# Patient Record
Sex: MALE | Race: OTHER | HISPANIC OR LATINO | Employment: FULL TIME | URBAN - METROPOLITAN AREA
[De-identification: names, ages, dates, MRNs, and addresses within clinical notes are randomized per-mention and may not be internally consistent; named-entity substitution may affect disease eponyms.]

---
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
## 2023-06-13 ENCOUNTER — HOSPITAL ENCOUNTER (EMERGENCY)
Facility: HOSPITAL | Age: 18
Discharge: HOME | End: 2023-06-13
Attending: EMERGENCY MEDICINE | Admitting: EMERGENCY MEDICINE

## 2023-06-13 VITALS
TEMPERATURE: 98.1 F | RESPIRATION RATE: 20 BRPM | WEIGHT: 168.65 LBS | DIASTOLIC BLOOD PRESSURE: 69 MMHG | SYSTOLIC BLOOD PRESSURE: 122 MMHG | HEART RATE: 75 BPM | OXYGEN SATURATION: 98 %

## 2023-06-13 DIAGNOSIS — S40.021A HEMATOMA OF ARM, RIGHT, INITIAL ENCOUNTER: Primary | ICD-10-CM

## 2023-06-13 PROCEDURE — 99283 EMERGENCY DEPT VISIT LOW MDM: CPT

## 2023-06-13 PROCEDURE — 99283 EMERGENCY DEPT VISIT LOW MDM: CPT | Performed by: EMERGENCY MEDICINE

## 2023-06-13 NOTE — Clinical Note
Dorian Marmolejo was seen and treated in our emergency department on 6/13/2023. No restrictions            Diagnosis:     Nj Alcala  may return to work on return date. He may return on this date: 06/15/2023    Nj Alcala can return to work without any restrictions on the date provided. If you have any questions or concerns, please don't hesitate to call.       Delonte Hunter, DO    ______________________________           _______________          _______________  Hospital Representative                              Date                                Time

## 2023-06-14 NOTE — DISCHARGE INSTRUCTIONS
Should you notice that you have any more bruising in different areas of your body, any episodes of significant bleeding from seemingly minor cuts, I would consider calling and scheduling a follow-up appointment with your PCP and/or coming immediately to the emergency department for evaluation.

## 2023-06-14 NOTE — ED PROVIDER NOTES
History  Chief Complaint   Patient presents with   • Medical Problem     Gave plasma on Friday, and has a bruise that "work wants me to be cleared for". No sob, chest pain, n/v/d. Carmen Castro is a 21year old male presenting for evaluation of bruising at the site on his arm that he used to donate plasma 5 days ago. Patient states that they used his right antecubital area for the site of his plasma donation. Initially after he donated plasma, he noted a small amount of bruising on the anterior bicep just proximal to the elbow. Over the subsequent days, the bruising has grown in size, he notes minor tenderness to the region. Patient went to work on Monday, he works for [employer], one of his coworkers noticed the bruising on his arm and reported it to his boss, his boss requested that he be evaluated prior to returning to work. He denies any history of easy bruising or bleeding. Denies any family history of bleeding disorders. Patient is requesting a work note to return to work. History provided by:  Patient   used: No        Prior to Admission Medications   Prescriptions Last Dose Informant Patient Reported? Taking?   ondansetron (ZOFRAN) 4 mg tablet   No No   Sig: Take 1 tablet (4 mg total) by mouth every 6 (six) hours      Facility-Administered Medications: None       Past Medical History:   Diagnosis Date   • Asthma        History reviewed. No pertinent surgical history. History reviewed. No pertinent family history. I have reviewed and agree with the history as documented. E-Cigarette/Vaping     E-Cigarette/Vaping Substances     Social History     Tobacco Use   • Smoking status: Former   • Smokeless tobacco: Never   Substance Use Topics   • Alcohol use: Yes     Comment: occa. • Drug use: Yes     Types: Marijuana     Comment: daily        Review of Systems   Constitutional: Negative for chills and fever. HENT: Negative for ear pain and sore throat.     Eyes: Negative for pain and visual disturbance. Respiratory: Negative for cough and shortness of breath. Cardiovascular: Negative for chest pain and palpitations. Gastrointestinal: Negative for abdominal pain and vomiting. Genitourinary: Negative for dysuria and hematuria. Musculoskeletal: Negative for arthralgias and back pain. Skin: Negative for color change and rash. Neurological: Negative for seizures and syncope. Hematological:        Bruising after donating plasma 5 days ago   All other systems reviewed and are negative. Physical Exam  ED Triage Vitals [06/13/23 2204]   Temperature Pulse Respirations Blood Pressure SpO2   98.1 °F (36.7 °C) 75 20 122/69 98 %      Temp Source Heart Rate Source Patient Position - Orthostatic VS BP Location FiO2 (%)   Tympanic Monitor Sitting Left arm --      Pain Score       --             Orthostatic Vital Signs  Vitals:    06/13/23 2204   BP: 122/69   Pulse: 75   Patient Position - Orthostatic VS: Sitting       Physical Exam  Vitals and nursing note reviewed. Constitutional:       Appearance: Normal appearance. HENT:      Head: Normocephalic and atraumatic. Right Ear: External ear normal.      Left Ear: External ear normal.      Mouth/Throat:      Mouth: Mucous membranes are moist.      Pharynx: Oropharynx is clear. Eyes:      General: No scleral icterus. Conjunctiva/sclera: Conjunctivae normal.   Cardiovascular:      Rate and Rhythm: Normal rate and regular rhythm. Pulses: Normal pulses. Heart sounds: Normal heart sounds. Pulmonary:      Effort: Pulmonary effort is normal. No respiratory distress. Breath sounds: Normal breath sounds. Abdominal:      General: Abdomen is flat. There is no distension. Tenderness: There is no abdominal tenderness. Musculoskeletal:         General: No tenderness or signs of injury. Cervical back: Neck supple. No rigidity. Skin:     General: Skin is warm.       Coloration: Skin is not jaundiced. Findings: Bruising present. No erythema or rash. Comments: Patient has a hematoma of the right anterior bicep just proximal to the elbow, the skin is soft to palpation, no significant tenderness to palpation, full range of motion of his upper extremity, he is neurovascularly intact distally   Neurological:      General: No focal deficit present. Mental Status: He is alert. Mental status is at baseline. Psychiatric:         Mood and Affect: Mood normal.         Behavior: Behavior normal.         ED Medications  Medications - No data to display    Diagnostic Studies  Results Reviewed     None                 No orders to display         Procedures  Procedures      ED Course                                       Medical Decision Making  Joseph Beltran is a 21year old male presenting for evaluation of bruising at the site on his arm that he used to donate plasma 5 days ago. He denies any history of easy bruising or significant bleeding. He stated that he needed a work note which is primarily why he came to the emergency department today. He denies any significant tenderness at the site. On exam, the patient has bruising of the anterior right bicep just proximal to the elbow. The area is soft to palpation, no significant tenderness to palpation. He has full range of motion of his arm. No other areas of bruising. Given that the patient did not have any red flag signs or symptoms such as history of the easy bruising or bleeding, patient stable for discharge, patient provided with a work note, I gave him very strict return precautions should he develop easy bruising or any significant bleeding from minor cuts, patient was understanding and agreeable to plan.     Hematoma of arm, right, initial encounter: acute illness or injury        Disposition  Final diagnoses:   Hematoma of arm, right, initial encounter     Time reflects when diagnosis was documented in both MDM as applicable and the Disposition within this note     Time User Action Codes Description Comment    6/13/2023 10:11 PM Delonte Benjamin Add [S40.021A] Hematoma of arm, right, initial encounter       ED Disposition     ED Disposition   Discharge    Condition   Stable    Date/Time   Tue Jun 13, 2023 10:11 PM    Comment   Evert Hardy discharge to home/self care. Follow-up Information     Follow up With Specialties Details Why [address] [place] [address] [facility] Schedule an appointment as soon as possible for a visit  As needed [address] [facility]  [phone]            Discharge Medication List as of 6/13/2023 10:12 PM      CONTINUE these medications which have NOT CHANGED    Details   ondansetron (ZOFRAN) 4 mg tablet Take 1 tablet (4 mg total) by mouth every 6 (six) hours, Starting Sun 5/8/2022, Normal           No discharge procedures on file. PDMP Review     None           ED Provider  Attending physically available and evaluated Evert Hardy. I managed the patient along with the ED Attending.     Electronically Signed by         Martha Wolfe DO  06/13/23 [facility]   06/13/23 0617

## 2023-06-14 NOTE — ED ATTENDING ATTESTATION
6/13/2023  IRoberto MD, saw and evaluated the patient. I have discussed the patient with the resident/non-physician practitioner and agree with the resident's/non-physician practitioner's findings, Plan of Care, and MDM as documented in the resident's/non-physician practitioner's note, except where noted. All available labs and Radiology studies were reviewed. I was present for key portions of any procedure(s) performed by the resident/non-physician practitioner and I was immediately available to provide assistance. At this point I agree with the current assessment done in the Emergency Department. I have conducted an independent evaluation of this patient a history and physical is as follows:  77-year-old male. Donated plasma Friday. Developed a large bruise to the right bicep. Denies other trauma. He did not injure it lifting. He works doing sanitation. They give him off tomorrow and require a work note to return. Physical exam: There is ecchymosis to the proximal right bicep. It is not tense or swollen. Neurovascular exam is normal.  Assessment/plan: This is not a bicep injury. This is a bruise from plasma donation. There is no compartment syndrome. Appropriate for discharge and outpatient management.   ED Course         Critical Care Time  Procedures

## 2023-07-05 PROBLEM — J45.909 ASTHMA: Status: RESOLVED | Noted: 2023-07-05 | Resolved: 2023-07-05

## 2023-07-05 PROBLEM — Z02.4 DRIVER'S PERMIT PHYSICAL EXAMINATION: Status: RESOLVED | Noted: 2023-07-05 | Resolved: 2023-07-05

## 2023-07-05 PROBLEM — J45.909 ASTHMA: Status: ACTIVE | Noted: 2023-07-05

## 2023-07-05 PROBLEM — Z02.4 DRIVER'S PERMIT PHYSICAL EXAMINATION: Status: ACTIVE | Noted: 2023-07-05

## 2024-08-03 ENCOUNTER — HOSPITAL ENCOUNTER (EMERGENCY)
Facility: HOSPITAL | Age: 19
Discharge: HOME | End: 2024-08-03
Attending: EMERGENCY MEDICINE

## 2024-08-03 VITALS — OXYGEN SATURATION: 97 % | TEMPERATURE: 97.8 F | RESPIRATION RATE: 18 BRPM | WEIGHT: 168.5 LBS | HEART RATE: 90 BPM

## 2024-08-03 DIAGNOSIS — Z48.02 VISIT FOR SUTURE REMOVAL: Primary | ICD-10-CM

## 2024-08-03 DIAGNOSIS — L08.9 ACUTE POST-TRAUMATIC WOUND INFECTION, INITIAL ENCOUNTER: ICD-10-CM

## 2024-08-03 DIAGNOSIS — T14.8XXA ACUTE POST-TRAUMATIC WOUND INFECTION, INITIAL ENCOUNTER: ICD-10-CM

## 2024-08-03 PROCEDURE — 99281 EMR DPT VST MAYX REQ PHY/QHP: CPT

## 2024-08-03 PROCEDURE — 99284 EMERGENCY DEPT VISIT MOD MDM: CPT | Performed by: PHYSICIAN ASSISTANT

## 2024-08-03 RX ORDER — CEPHALEXIN 500 MG/1
500 CAPSULE ORAL EVERY 8 HOURS SCHEDULED
Qty: 30 CAPSULE | Refills: 0 | Status: SHIPPED | OUTPATIENT
Start: 2024-08-03 | End: 2024-08-13

## 2024-08-03 RX ORDER — NAPROXEN 500 MG/1
500 TABLET ORAL 2 TIMES DAILY WITH MEALS
Qty: 20 TABLET | Refills: 0 | Status: SHIPPED | OUTPATIENT
Start: 2024-08-03 | End: 2025-08-03

## 2024-08-03 NOTE — Clinical Note
Prashanth Dillard was seen and treated in our emergency department on 8/3/2024.                Diagnosis:     Prashanth  may return to work on return date.    He may return on this date: 08/05/2024         If you have any questions or concerns, please don't hesitate to call.      Carly Ramirez PA-C    ______________________________           _______________          _______________  Hospital Representative                              Date                                Time
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
